# Patient Record
Sex: MALE | Race: WHITE | NOT HISPANIC OR LATINO | URBAN - METROPOLITAN AREA
[De-identification: names, ages, dates, MRNs, and addresses within clinical notes are randomized per-mention and may not be internally consistent; named-entity substitution may affect disease eponyms.]

---

## 2017-03-09 ENCOUNTER — APPOINTMENT (RX ONLY)
Dept: URBAN - METROPOLITAN AREA CLINIC 169 | Facility: CLINIC | Age: 47
Setting detail: DERMATOLOGY
End: 2017-03-09

## 2017-03-09 DIAGNOSIS — L30.3 INFECTIVE DERMATITIS: ICD-10-CM | Status: INADEQUATELY CONTROLLED

## 2017-03-09 PROCEDURE — ? PRESCRIPTION

## 2017-03-09 PROCEDURE — 99203 OFFICE O/P NEW LOW 30 MIN: CPT

## 2017-03-09 PROCEDURE — ? COUNSELING

## 2017-03-09 PROCEDURE — ? PATIENT SPECIFIC COUNSELING

## 2017-03-09 RX ORDER — TRIAMCINOLONE ACETONIDE 1 MG/G
1 CREAM TOPICAL BID
Qty: 1 | Refills: 3 | Status: ERX | COMMUNITY
Start: 2017-03-09

## 2017-03-09 RX ORDER — MUPIROCIN 20 MG/G
1 OINTMENT TOPICAL BID
Qty: 2 | Refills: 3 | Status: ERX | COMMUNITY
Start: 2017-03-09

## 2017-03-09 RX ADMIN — TRIAMCINOLONE ACETONIDE 1: 1 CREAM TOPICAL at 17:09

## 2017-03-09 RX ADMIN — MUPIROCIN 1: 20 OINTMENT TOPICAL at 17:09

## 2017-03-09 ASSESSMENT — SEVERITY ASSESSMENT: SEVERITY: MODERATE

## 2017-03-09 ASSESSMENT — LOCATION ZONE DERM
LOCATION ZONE: ARM
LOCATION ZONE: LEG

## 2017-03-09 ASSESSMENT — LOCATION DETAILED DESCRIPTION DERM
LOCATION DETAILED: RIGHT DISTAL PRETIBIAL REGION
LOCATION DETAILED: RIGHT VENTRAL PROXIMAL FOREARM
LOCATION DETAILED: LEFT VENTRAL PROXIMAL FOREARM
LOCATION DETAILED: LEFT DISTAL PRETIBIAL REGION

## 2017-03-09 ASSESSMENT — LOCATION SIMPLE DESCRIPTION DERM
LOCATION SIMPLE: RIGHT PRETIBIAL REGION
LOCATION SIMPLE: LEFT PRETIBIAL REGION
LOCATION SIMPLE: RIGHT FOREARM
LOCATION SIMPLE: LEFT FOREARM

## 2017-03-09 NOTE — PROCEDURE: PATIENT SPECIFIC COUNSELING
Detail Level: Simple
patient has an infectious eczematous dermatitis over his legs and forearms.  I discussed with the patient that this condition is a type of dermatitis that is triggered by purulent discharge from a primary infected site.  Treatment usually requires treatment of both the infection (cellulitis) with antibiotics and with the inflammatory aspect of the dermatitis with topical and sometimes oral steroids.  \\n\\nHe appears to be on adequate antibiotic therapy with clindamycin 300 mg 4 times a day.  I instructed the patient to continue taking this course of antibiotic until it is completed.  Topically, I have I will have him start on triamcinolone 0.1% ointment to 3 times a day along with mupirocin 2% ointment to open sores. I also performing daily diluted beach bath over the next week to reduce bacteria load on the skin.  Will recheck condition in 1 week.  \\n\\nPatient reports having leg edema for at least the past year.  This is a predisposing factor to infectious eczematoid dermatitis and better control of the edema will help prevent recurrence of the condition.  I discussed with the patient that leg edema can only be improved by four main methods: 1) elevation, 2) compression therapy with stockings, 3) diuretic therapy, and 4) improving any underlying medical conditions that contributes to the edema (ie. weight, HTN, cardiac, etc...).  The latter two methods would need to be addressed by his PCP.

## 2017-03-16 ENCOUNTER — APPOINTMENT (RX ONLY)
Dept: URBAN - METROPOLITAN AREA CLINIC 169 | Facility: CLINIC | Age: 47
Setting detail: DERMATOLOGY
End: 2017-03-16

## 2017-03-16 DIAGNOSIS — I83.9 ASYMPTOMATIC VARICOSE VEINS OF LOWER EXTREMITIES: ICD-10-CM

## 2017-03-16 DIAGNOSIS — L30.3 INFECTIVE DERMATITIS: ICD-10-CM | Status: RESOLVED

## 2017-03-16 DIAGNOSIS — R60.0 LOCALIZED EDEMA: ICD-10-CM | Status: INADEQUATELY CONTROLLED

## 2017-03-16 PROBLEM — L70.0 ACNE VULGARIS: Status: ACTIVE | Noted: 2017-03-16

## 2017-03-16 PROBLEM — L55.1 SUNBURN OF SECOND DEGREE: Status: ACTIVE | Noted: 2017-03-16

## 2017-03-16 PROBLEM — J45.909 UNSPECIFIED ASTHMA, UNCOMPLICATED: Status: ACTIVE | Noted: 2017-03-16

## 2017-03-16 PROBLEM — I83.90 ASYMPTOMATIC VARICOSE VEINS OF UNSPECIFIED LOWER EXTREMITY: Status: ACTIVE | Noted: 2017-03-16

## 2017-03-16 PROCEDURE — ? PATIENT SPECIFIC COUNSELING

## 2017-03-16 PROCEDURE — 99213 OFFICE O/P EST LOW 20 MIN: CPT

## 2017-03-16 PROCEDURE — ? COUNSELING

## 2017-03-16 ASSESSMENT — LOCATION DETAILED DESCRIPTION DERM
LOCATION DETAILED: LEFT DISTAL PRETIBIAL REGION
LOCATION DETAILED: RIGHT VENTRAL PROXIMAL FOREARM
LOCATION DETAILED: RIGHT DISTAL PRETIBIAL REGION
LOCATION DETAILED: LEFT VENTRAL PROXIMAL FOREARM

## 2017-03-16 ASSESSMENT — LOCATION ZONE DERM
LOCATION ZONE: LEG
LOCATION ZONE: ARM

## 2017-03-16 ASSESSMENT — LOCATION SIMPLE DESCRIPTION DERM
LOCATION SIMPLE: LEFT FOREARM
LOCATION SIMPLE: RIGHT FOREARM
LOCATION SIMPLE: LEFT PRETIBIAL REGION
LOCATION SIMPLE: RIGHT PRETIBIAL REGION

## 2017-03-16 NOTE — PROCEDURE: PATIENT SPECIFIC COUNSELING
Detail Level: Simple
condition resolved with current treatment with clindamycin, which he has one day remaining, triamcinolone cream, and mupirocin ointment.  He reports no further swelling/itching and he believes doing bleach bath helped tremendously.  He can use the topical medications for any future flares.  Answered him to finish his last day of antibiotic.  Followup as needed.
this is likely a contributory factor to his leg edema which could predispose to future bouts of infectious eczematoid dermatitis.  I recommend that he see Dr. Lundy for evaluation for surgical management.